# Patient Record
Sex: FEMALE | Race: WHITE | HISPANIC OR LATINO
[De-identification: names, ages, dates, MRNs, and addresses within clinical notes are randomized per-mention and may not be internally consistent; named-entity substitution may affect disease eponyms.]

---

## 2021-08-11 ENCOUNTER — TRANSCRIPTION ENCOUNTER (OUTPATIENT)
Age: 56
End: 2021-08-11

## 2022-09-13 ENCOUNTER — NON-APPOINTMENT (OUTPATIENT)
Age: 57
End: 2022-09-13

## 2022-10-10 ENCOUNTER — APPOINTMENT (OUTPATIENT)
Dept: ORTHOPEDIC SURGERY | Facility: CLINIC | Age: 57
End: 2022-10-10

## 2022-10-10 VITALS — WEIGHT: 160 LBS | BODY MASS INDEX: 31.41 KG/M2 | HEIGHT: 60 IN

## 2022-10-10 DIAGNOSIS — M79.643 PAIN IN UNSPECIFIED HAND: ICD-10-CM

## 2022-10-10 PROCEDURE — 99072 ADDL SUPL MATRL&STAF TM PHE: CPT

## 2022-10-10 PROCEDURE — 99203 OFFICE O/P NEW LOW 30 MIN: CPT | Mod: 25

## 2022-10-10 PROCEDURE — 73130 X-RAY EXAM OF HAND: CPT | Mod: RT

## 2022-10-10 PROCEDURE — 20605 DRAIN/INJ JOINT/BURSA W/O US: CPT | Mod: RT

## 2022-10-10 PROCEDURE — J3490M: CUSTOM

## 2022-10-10 PROCEDURE — L3908: CPT | Mod: RT

## 2022-10-11 NOTE — HISTORY OF PRESENT ILLNESS
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

## 2022-10-11 NOTE — PHYSICAL EXAM
[de-identified] : R wrist:\par Swelling\par Tender TFCC\par Pain with pronation/supination\par Decreased wrist ROM\par +TFCC grind\par \par \par Xrays neg

## 2022-10-12 RX ORDER — MELOXICAM 15 MG/1
15 TABLET ORAL
Qty: 30 | Refills: 2 | Status: ACTIVE | COMMUNITY
Start: 2022-10-10 | End: 1900-01-01

## 2022-10-16 ENCOUNTER — APPOINTMENT (OUTPATIENT)
Dept: MRI IMAGING | Facility: CLINIC | Age: 57
End: 2022-10-16

## 2022-11-07 ENCOUNTER — FORM ENCOUNTER (OUTPATIENT)
Age: 57
End: 2022-11-07

## 2022-11-08 ENCOUNTER — APPOINTMENT (OUTPATIENT)
Dept: MRI IMAGING | Facility: CLINIC | Age: 57
End: 2022-11-08

## 2022-11-08 PROCEDURE — 73221 MRI JOINT UPR EXTREM W/O DYE: CPT | Mod: RT

## 2022-11-08 PROCEDURE — 99072 ADDL SUPL MATRL&STAF TM PHE: CPT

## 2022-11-14 ENCOUNTER — APPOINTMENT (OUTPATIENT)
Dept: ORTHOPEDIC SURGERY | Facility: CLINIC | Age: 57
End: 2022-11-14

## 2022-11-21 ENCOUNTER — APPOINTMENT (OUTPATIENT)
Dept: ORTHOPEDIC SURGERY | Facility: CLINIC | Age: 57
End: 2022-11-21

## 2022-11-23 ENCOUNTER — APPOINTMENT (OUTPATIENT)
Dept: ORTHOPEDIC SURGERY | Facility: CLINIC | Age: 57
End: 2022-11-23

## 2022-11-23 VITALS — BODY MASS INDEX: 22.9 KG/M2 | WEIGHT: 160 LBS | HEIGHT: 70 IN

## 2022-11-23 PROCEDURE — 99214 OFFICE O/P EST MOD 30 MIN: CPT | Mod: 25

## 2022-11-23 PROCEDURE — 20550 NJX 1 TENDON SHEATH/LIGAMENT: CPT

## 2022-11-23 PROCEDURE — 99072 ADDL SUPL MATRL&STAF TM PHE: CPT

## 2022-11-23 NOTE — ASSESSMENT
[FreeTextEntry1] : It appears that the greatest area of concern is the ECU.  \par \par The patient was advised of the diagnosis. The natural history of the pathology was explained in full to the patient in layman's terms. All questions were answered. The risks and benefits of surgical and non-surgical treatment alternatives were explained in full to the patient.\par \par Pt will continue with brace.\par Start OT\par Pt was given CSI in ECU today\par \par Tendon sheath injection was performed of the left wrist. The indication for this procedure was pain and inflammation. The site was prepped with alcohol and ethyl chloride sprayed topically. An injection of Lidocaine 1cc of 1%  was used Betamethasone (Celestone) 1cc of 6mg.  Patient was advised to call if redness, pain or fever occur and apply ice for 15 minutes out of every hour for the next 12-24 hours as tolerated. The risks benefits, and alternatives have been discussed, and verbal consent was obtained\par

## 2022-11-23 NOTE — IMAGING
[de-identified] : R wrist:\par Ulnar sided swelling\par Tender TFCC and *ECU* regions w/o noted subluxation with ROM.\par Pain with pronation/supination\par Decreased wrist flexion to 60 deg / flexion to 70 degrees due to discomfort.\par Mildly +TFCC grind\par Jacob and Finkelstein Tests are negative.\par \par \par

## 2022-12-19 ENCOUNTER — APPOINTMENT (OUTPATIENT)
Dept: ORTHOPEDIC SURGERY | Facility: CLINIC | Age: 57
End: 2022-12-19

## 2022-12-19 VITALS — BODY MASS INDEX: 22.9 KG/M2 | HEIGHT: 70 IN | WEIGHT: 160 LBS

## 2022-12-19 PROCEDURE — 99213 OFFICE O/P EST LOW 20 MIN: CPT

## 2022-12-19 PROCEDURE — 99072 ADDL SUPL MATRL&STAF TM PHE: CPT

## 2022-12-19 NOTE — HISTORY OF PRESENT ILLNESS
[Work related] : work related [6] : 6 [5] : 5 [Dull/Aching] : dull/aching [Full time] : Work status: full time [de-identified] : R wrist MRI shows TFCC tear, ECU tendonitis [FreeTextEntry1] : R hand [de-identified] : therapy

## 2022-12-19 NOTE — ASSESSMENT
[FreeTextEntry1] : I discussed injeciton and surgery\par I disucssed NSIADS\par Brace\par Ice\par Therapy \par Return in 4 weeks

## 2022-12-19 NOTE — PHYSICAL EXAM
[de-identified] : R wrist:\par Swelling\par Tender TFCC\par Pain with pronation/supination\par Decreased wrist ROM\par +TFCC grind\par \par \par

## 2023-01-12 ENCOUNTER — APPOINTMENT (OUTPATIENT)
Dept: ORTHOPEDIC SURGERY | Facility: CLINIC | Age: 58
End: 2023-01-12
Payer: OTHER MISCELLANEOUS

## 2023-01-12 VITALS — WEIGHT: 160 LBS | BODY MASS INDEX: 22.9 KG/M2 | HEIGHT: 70 IN

## 2023-01-12 PROCEDURE — 99214 OFFICE O/P EST MOD 30 MIN: CPT | Mod: 57

## 2023-01-12 PROCEDURE — 99072 ADDL SUPL MATRL&STAF TM PHE: CPT

## 2023-01-12 NOTE — PHYSICAL EXAM
[de-identified] : R wrist:\par Swelling\par Tender TFCC\par Pain with pronation/supination\par Decreased wrist ROM\par +TFCC grind\par \par \par

## 2023-01-12 NOTE — ASSESSMENT
[FreeTextEntry1] : For R wrist arthroscopy TFCC debridement \par R/B/A of surgery discussed with the patient. Risks including but not limited to infection, nerve damage, tendon damage, pain, stiffness, recurrence, no resolution of symptoms, loss of function, limb or life. They understand and agree to surgery \par Return post op

## 2023-01-12 NOTE — HISTORY OF PRESENT ILLNESS
[Work related] : work related [9] : 9 [8] : 8 [Dull/Aching] : dull/aching [Part time] : Work status: part time [de-identified] : R wrist is still very painful [FreeTextEntry1] : r wrist [de-identified] : ice,therapy,brace

## 2023-02-05 ENCOUNTER — FORM ENCOUNTER (OUTPATIENT)
Age: 58
End: 2023-02-05

## 2023-02-08 ENCOUNTER — FORM ENCOUNTER (OUTPATIENT)
Age: 58
End: 2023-02-08

## 2023-02-14 ENCOUNTER — NON-APPOINTMENT (OUTPATIENT)
Age: 58
End: 2023-02-14

## 2023-02-16 ENCOUNTER — APPOINTMENT (OUTPATIENT)
Dept: ORTHOPEDIC SURGERY | Facility: CLINIC | Age: 58
End: 2023-02-16

## 2023-03-20 RX ORDER — OXYCODONE AND ACETAMINOPHEN 5; 325 MG/1; MG/1
5-325 TABLET ORAL
Qty: 15 | Refills: 0 | Status: ACTIVE | COMMUNITY
Start: 2023-03-20 | End: 1900-01-01

## 2023-03-22 ENCOUNTER — APPOINTMENT (OUTPATIENT)
Age: 58
End: 2023-03-22
Payer: OTHER MISCELLANEOUS

## 2023-03-22 PROCEDURE — 29846 WRIST ARTHROSCOPY/SURGERY: CPT | Mod: RT

## 2023-03-22 PROCEDURE — 29846 WRIST ARTHROSCOPY/SURGERY: CPT | Mod: AS,RT

## 2023-03-22 RX ORDER — TRAMADOL HYDROCHLORIDE 50 MG/1
50 TABLET, COATED ORAL EVERY 6 HOURS
Qty: 15 | Refills: 0 | Status: ACTIVE | COMMUNITY
Start: 2023-03-22 | End: 1900-01-01

## 2023-03-30 ENCOUNTER — APPOINTMENT (OUTPATIENT)
Dept: ORTHOPEDIC SURGERY | Facility: CLINIC | Age: 58
End: 2023-03-30
Payer: OTHER MISCELLANEOUS

## 2023-03-30 VITALS — BODY MASS INDEX: 22.9 KG/M2 | WEIGHT: 160 LBS | HEIGHT: 70 IN

## 2023-03-30 PROCEDURE — 99024 POSTOP FOLLOW-UP VISIT: CPT

## 2023-03-30 PROCEDURE — L3908: CPT | Mod: RT

## 2023-03-30 NOTE — PHYSICAL EXAM
[de-identified] : Mild hand swelling\par Healed incision\par No evidence of infection\par Mild tenderness at the surgical site\par Good finger ROM\par Wrist stiffness

## 2023-03-30 NOTE — ASSESSMENT
[FreeTextEntry1] : Sutures removed\par Steris applied\par Wrist brace provided in office\par Return in 3 weeks- therapy\par No work

## 2023-03-30 NOTE — HISTORY OF PRESENT ILLNESS
[Work related] : work related [7] : 7 [5] : 5 [Dull/Aching] : dull/aching [Not working due to injury] : Work status: not working due to injury [] : Post Surgical Visit: yes [de-identified] : R wrist TFCC debridement \par She is feeling better  [FreeTextEntry1] : R wrist  [de-identified] : no [de-identified] : 3/22/23 [de-identified] : R wrist arthroscopy TFCC debridement

## 2023-04-06 ENCOUNTER — NON-APPOINTMENT (OUTPATIENT)
Age: 58
End: 2023-04-06

## 2023-04-17 ENCOUNTER — APPOINTMENT (OUTPATIENT)
Dept: ORTHOPEDIC SURGERY | Facility: CLINIC | Age: 58
End: 2023-04-17
Payer: OTHER MISCELLANEOUS

## 2023-04-17 VITALS — HEIGHT: 70 IN | BODY MASS INDEX: 22.9 KG/M2 | WEIGHT: 160 LBS

## 2023-04-17 PROCEDURE — 99024 POSTOP FOLLOW-UP VISIT: CPT

## 2023-04-17 NOTE — HISTORY OF PRESENT ILLNESS
[8] : 8 [Dull/Aching] : dull/aching [] : Post Surgical Visit: yes [de-identified] : R wrist TFCC debridement 3-4 weeks ago \par She still has pain  [FreeTextEntry1] : R wrist [de-identified] : none [de-identified] : 3/22/23 [de-identified] : right wrist WRIST ARTHROSCOPY TFCC DEBRIEDMENT

## 2023-05-11 ENCOUNTER — APPOINTMENT (OUTPATIENT)
Dept: ORTHOPEDIC SURGERY | Facility: CLINIC | Age: 58
End: 2023-05-11
Payer: OTHER MISCELLANEOUS

## 2023-05-11 VITALS — HEIGHT: 70 IN | WEIGHT: 160 LBS | BODY MASS INDEX: 22.9 KG/M2

## 2023-05-11 PROCEDURE — 99024 POSTOP FOLLOW-UP VISIT: CPT

## 2023-05-11 NOTE — HISTORY OF PRESENT ILLNESS
[Work related] : work related [7] : 7 [4] : 4 [Dull/Aching] : dull/aching [Full time] : Work status: full time [] : Post Surgical Visit: yes [de-identified] : R wrist TFCC debridement\par She is gettting better\par Still has some pain  [FreeTextEntry1] : R wrist [de-identified] : OT,wrap [de-identified] : 3/22/23 [de-identified] : right wrist WRIST ARTHROSCOPY TFCC DEBRIEDMENT

## 2023-05-31 ENCOUNTER — OFFICE (OUTPATIENT)
Facility: LOCATION | Age: 58
Setting detail: OPHTHALMOLOGY
End: 2023-05-31
Payer: MEDICAID

## 2023-05-31 DIAGNOSIS — H33.312: ICD-10-CM

## 2023-05-31 DIAGNOSIS — H43.393: ICD-10-CM

## 2023-05-31 DIAGNOSIS — H25.13: ICD-10-CM

## 2023-05-31 DIAGNOSIS — H16.223: ICD-10-CM

## 2023-05-31 PROCEDURE — 92014 COMPRE OPH EXAM EST PT 1/>: CPT | Performed by: OPHTHALMOLOGY

## 2023-05-31 PROCEDURE — 92134 CPTRZ OPH DX IMG PST SGM RTA: CPT | Performed by: OPHTHALMOLOGY

## 2023-05-31 ASSESSMENT — REFRACTION_AUTOREFRACTION
OD_SPHERE: +2.00
OS_CYLINDER: SPH
OD_CYLINDER: -0.75
OS_SPHERE: +1.75
OD_AXIS: 037

## 2023-05-31 ASSESSMENT — REFRACTION_MANIFEST
OS_SPHERE: +0.50
OS_AXIS: 135
OD_SPHERE: +1.00
OD_VA1: 20/25
OS_ADD: +2.00
OS_VA1: 20/30
OD_AXIS: 20
OS_CYLINDER: -0.50
OD_CYLINDER: -0.50
OD_ADD: +2.00

## 2023-05-31 ASSESSMENT — REFRACTION_CURRENTRX
OS_SPHERE: +2.75
OS_VPRISM_DIRECTION: SV
OS_OVR_VA: 20/
OD_SPHERE: +2.75
OD_SPHERE: +2.25
OD_AXIS: 017
OD_VPRISM_DIRECTION: SV
OS_AXIS: 140
OS_OVR_VA: 20/
OS_SPHERE: +2.25
OS_CYLINDER: -0.50
OD_OVR_VA: 20/
OD_OVR_VA: 20/
OD_CYLINDER: -0.50

## 2023-05-31 ASSESSMENT — SUPERFICIAL PUNCTATE KERATITIS (SPK)
OD_SPK: 3+
OS_SPK: 3+

## 2023-05-31 ASSESSMENT — CONFRONTATIONAL VISUAL FIELD TEST (CVF)
OD_FINDINGS: FULL
OS_FINDINGS: FULL

## 2023-05-31 ASSESSMENT — VISUAL ACUITY
OS_BCVA: 20/50
OD_BCVA: 20/60+1

## 2023-05-31 ASSESSMENT — TONOMETRY
OS_IOP_MMHG: 15
OD_IOP_MMHG: 15

## 2023-05-31 ASSESSMENT — SPHEQUIV_DERIVED
OD_SPHEQUIV: 0.75
OS_SPHEQUIV: 0.25
OD_SPHEQUIV: 1.625

## 2023-06-01 ENCOUNTER — APPOINTMENT (OUTPATIENT)
Dept: ORTHOPEDIC SURGERY | Facility: CLINIC | Age: 58
End: 2023-06-01
Payer: OTHER MISCELLANEOUS

## 2023-06-01 ENCOUNTER — OFFICE (OUTPATIENT)
Dept: URBAN - METROPOLITAN AREA CLINIC 77 | Facility: CLINIC | Age: 58
Setting detail: OPHTHALMOLOGY
End: 2023-06-01
Payer: MEDICAID

## 2023-06-01 VITALS — WEIGHT: 160 LBS | HEIGHT: 70 IN | BODY MASS INDEX: 22.9 KG/M2

## 2023-06-01 DIAGNOSIS — H33.312: ICD-10-CM

## 2023-06-01 DIAGNOSIS — H43.22: ICD-10-CM

## 2023-06-01 DIAGNOSIS — H25.13: ICD-10-CM

## 2023-06-01 PROBLEM — H43.393 VITREOUS FLOATERS; BOTH EYES: Status: ACTIVE | Noted: 2023-05-31

## 2023-06-01 PROBLEM — H11.153 PINGUECULA; BOTH EYES: Status: ACTIVE | Noted: 2023-05-31

## 2023-06-01 PROCEDURE — 92250 FUNDUS PHOTOGRAPHY W/I&R: CPT | Performed by: OPHTHALMOLOGY

## 2023-06-01 PROCEDURE — 99024 POSTOP FOLLOW-UP VISIT: CPT

## 2023-06-01 PROCEDURE — 99214 OFFICE O/P EST MOD 30 MIN: CPT | Performed by: OPHTHALMOLOGY

## 2023-06-01 PROCEDURE — 67145 PROPH RTA DTCHMNT PC: CPT | Performed by: OPHTHALMOLOGY

## 2023-06-01 ASSESSMENT — KERATOMETRY
OD_K1POWER_DIOPTERS: 43.50
OS_K2POWER_DIOPTERS: 42.75
OD_AXISANGLE_DEGREES: 048
OS_AXISANGLE_DEGREES: 127
OD_K2POWER_DIOPTERS: 43.00
OS_K1POWER_DIOPTERS: 43.00

## 2023-06-01 ASSESSMENT — REFRACTION_CURRENTRX
OD_CYLINDER: -0.50
OD_SPHERE: +2.75
OD_SPHERE: +2.25
OS_AXIS: 140
OD_OVR_VA: 20/
OS_SPHERE: +2.75
OD_OVR_VA: 20/
OD_AXIS: 017
OS_OVR_VA: 20/
OD_VPRISM_DIRECTION: SV
OS_CYLINDER: -0.50
OS_OVR_VA: 20/
OS_SPHERE: +2.25
OS_VPRISM_DIRECTION: SV

## 2023-06-01 ASSESSMENT — REFRACTION_AUTOREFRACTION
OD_CYLINDER: -0.75
OS_SPHERE: +1.75
OS_CYLINDER: SPH
OD_SPHERE: +2.00
OD_AXIS: 037

## 2023-06-01 ASSESSMENT — AXIALLENGTH_DERIVED
OS_AL: 23.7248
OD_AL: 23.0628
OD_AL: 23.3932

## 2023-06-01 ASSESSMENT — VISUAL ACUITY
OS_BCVA: 20/30
OD_BCVA: 20/30

## 2023-06-01 ASSESSMENT — CONFRONTATIONAL VISUAL FIELD TEST (CVF)
OS_FINDINGS: FULL
OD_FINDINGS: FULL

## 2023-06-01 ASSESSMENT — REFRACTION_MANIFEST
OS_AXIS: 135
OS_CYLINDER: -0.50
OS_ADD: +2.00
OD_CYLINDER: -0.50
OS_SPHERE: +0.50
OS_VA1: 20/30
OD_SPHERE: +1.00
OD_VA1: 20/25
OD_AXIS: 20
OD_ADD: +2.00

## 2023-06-01 ASSESSMENT — SPHEQUIV_DERIVED
OD_SPHEQUIV: 0.75
OD_SPHEQUIV: 1.625
OS_SPHEQUIV: 0.25

## 2023-06-01 ASSESSMENT — SUPERFICIAL PUNCTATE KERATITIS (SPK)
OD_SPK: 3+
OS_SPK: 3+

## 2023-06-01 NOTE — HISTORY OF PRESENT ILLNESS
[Work related] : work related [5] : 5 [3] : 3 [Dull/Aching] : dull/aching [Full time] : Work status: full time [] : Post Surgical Visit: yes [de-identified] : R wist TFCC debridement\par She is feeling better\par Still has some pain  [FreeTextEntry1] : R wrist [de-identified] : OT,wrap [FreeTextEntry5] : pain is better\par  [de-identified] : 3/22/23 [de-identified] : right wrist WRIST ARTHROSCOPY TFCC DEBRIEDMENT

## 2023-06-03 ENCOUNTER — APPOINTMENT (OUTPATIENT)
Dept: MRI IMAGING | Facility: CLINIC | Age: 58
End: 2023-06-03

## 2023-06-08 ENCOUNTER — RX RENEWAL (OUTPATIENT)
Age: 58
End: 2023-06-08

## 2023-06-08 RX ORDER — NAPROXEN 500 MG/1
500 TABLET ORAL 3 TIMES DAILY
Qty: 60 | Refills: 0 | Status: ACTIVE | COMMUNITY
Start: 2023-01-12 | End: 1900-01-01

## 2023-06-15 ENCOUNTER — APPOINTMENT (OUTPATIENT)
Dept: ORTHOPEDIC SURGERY | Facility: CLINIC | Age: 58
End: 2023-06-15

## 2023-06-19 ENCOUNTER — FORM ENCOUNTER (OUTPATIENT)
Age: 58
End: 2023-06-19

## 2023-06-20 ENCOUNTER — APPOINTMENT (OUTPATIENT)
Dept: MRI IMAGING | Facility: CLINIC | Age: 58
End: 2023-06-20
Payer: OTHER MISCELLANEOUS

## 2023-06-20 ENCOUNTER — OFFICE (OUTPATIENT)
Facility: LOCATION | Age: 58
Setting detail: OPHTHALMOLOGY
End: 2023-06-20
Payer: MEDICAID

## 2023-06-20 ENCOUNTER — RX ONLY (RX ONLY)
Age: 58
End: 2023-06-20

## 2023-06-20 DIAGNOSIS — H16.223: ICD-10-CM

## 2023-06-20 DIAGNOSIS — H25.13: ICD-10-CM

## 2023-06-20 DIAGNOSIS — H25.12: ICD-10-CM

## 2023-06-20 PROBLEM — H25.11 CATARACT SENILE NUCLEAR SCLEROSIS; RIGHT EYE, LEFT EYE, BOTH EYES: Status: ACTIVE | Noted: 2023-06-20

## 2023-06-20 PROCEDURE — 73221 MRI JOINT UPR EXTREM W/O DYE: CPT | Mod: RT

## 2023-06-20 PROCEDURE — 92136 OPHTHALMIC BIOMETRY: CPT | Performed by: OPHTHALMOLOGY

## 2023-06-20 PROCEDURE — 99214 OFFICE O/P EST MOD 30 MIN: CPT | Performed by: OPHTHALMOLOGY

## 2023-06-20 ASSESSMENT — REFRACTION_AUTOREFRACTION
OS_SPHERE: +1.50
OD_AXIS: 039
OS_CYLINDER: SPH
OD_SPHERE: +1.50
OD_CYLINDER: -0.75

## 2023-06-20 ASSESSMENT — REFRACTION_CURRENTRX
OD_SPHERE: +2.25
OS_AXIS: 140
OD_AXIS: 017
OD_OVR_VA: 20/
OS_OVR_VA: 20/
OS_SPHERE: +2.25
OD_VPRISM_DIRECTION: SV
OS_CYLINDER: -0.50
OD_SPHERE: +2.75
OD_CYLINDER: -0.50
OD_OVR_VA: 20/
OS_VPRISM_DIRECTION: SV
OS_OVR_VA: 20/
OS_SPHERE: +2.75

## 2023-06-20 ASSESSMENT — REFRACTION_MANIFEST
OS_ADD: +2.00
OD_ADD: +2.00
OD_SPHERE: +1.00
OD_CYLINDER: -0.50
OS_VA1: 20/30
OD_VA1: 20/25
OS_SPHERE: +0.50
OS_CYLINDER: -0.50
OS_AXIS: 135
OD_AXIS: 20

## 2023-06-20 ASSESSMENT — TONOMETRY
OS_IOP_MMHG: 15
OD_IOP_MMHG: 16

## 2023-06-20 ASSESSMENT — AXIALLENGTH_DERIVED
OD_AL: 23.3932
OD_AL: 23.2504
OS_AL: 23.6323

## 2023-06-20 ASSESSMENT — KERATOMETRY
OS_K2POWER_DIOPTERS: 43.00
OS_K1POWER_DIOPTERS: 43.25
OD_AXISANGLE_DEGREES: 129
OD_K1POWER_DIOPTERS: 43.00
OS_AXISANGLE_DEGREES: 112
OD_K2POWER_DIOPTERS: 43.50

## 2023-06-20 ASSESSMENT — VISUAL ACUITY
OS_BCVA: 20/80
OD_BCVA: 20/60

## 2023-06-20 ASSESSMENT — SPHEQUIV_DERIVED
OD_SPHEQUIV: 0.75
OD_SPHEQUIV: 1.125
OS_SPHEQUIV: 0.25

## 2023-06-20 ASSESSMENT — CONFRONTATIONAL VISUAL FIELD TEST (CVF)
OD_FINDINGS: FULL
OS_FINDINGS: FULL

## 2023-06-20 ASSESSMENT — SUPERFICIAL PUNCTATE KERATITIS (SPK)
OS_SPK: 3+
OD_SPK: 3+

## 2023-06-25 ENCOUNTER — NON-APPOINTMENT (OUTPATIENT)
Age: 58
End: 2023-06-25

## 2023-06-26 ENCOUNTER — FORM ENCOUNTER (OUTPATIENT)
Age: 58
End: 2023-06-26

## 2023-07-17 ENCOUNTER — APPOINTMENT (OUTPATIENT)
Dept: ORTHOPEDIC SURGERY | Facility: CLINIC | Age: 58
End: 2023-07-17
Payer: OTHER MISCELLANEOUS

## 2023-07-17 VITALS — HEIGHT: 70 IN | WEIGHT: 160 LBS | BODY MASS INDEX: 22.9 KG/M2

## 2023-07-17 PROCEDURE — 99213 OFFICE O/P EST LOW 20 MIN: CPT

## 2023-07-17 NOTE — HISTORY OF PRESENT ILLNESS
[Work related] : work related [5] : 5 [Dull/Aching] : dull/aching [Full time] : Work status: full time [8] : 8 [de-identified] : R wrist MRI shows improvement in TFCC; significant ECU tendonitis [FreeTextEntry1] : R wrist [de-identified] : lifting an object, movement

## 2023-07-17 NOTE — PHYSICAL EXAM
[de-identified] : R wrist \par Swelling\par Min tender TFCC\par Improved ROM\par Tender along ECU\par

## 2023-08-15 ENCOUNTER — NON-APPOINTMENT (OUTPATIENT)
Age: 58
End: 2023-08-15

## 2023-08-17 ENCOUNTER — APPOINTMENT (OUTPATIENT)
Dept: ORTHOPEDIC SURGERY | Facility: CLINIC | Age: 58
End: 2023-08-17
Payer: OTHER MISCELLANEOUS

## 2023-08-17 VITALS — BODY MASS INDEX: 22.9 KG/M2 | HEIGHT: 70 IN | WEIGHT: 160 LBS

## 2023-08-17 PROCEDURE — 20550 NJX 1 TENDON SHEATH/LIGAMENT: CPT | Mod: RT

## 2023-08-17 PROCEDURE — J3490M: CUSTOM

## 2023-08-17 PROCEDURE — 99214 OFFICE O/P EST MOD 30 MIN: CPT | Mod: 25

## 2023-08-17 NOTE — HISTORY OF PRESENT ILLNESS
[Work related] : work related [4] : 4 [Dull/Aching] : dull/aching [Full time] : Work status: full time [de-identified] : R wrist still has ulnar sided pain  [FreeTextEntry1] : R wrist [de-identified] : pt

## 2023-08-17 NOTE — ASSESSMENT
[FreeTextEntry1] : R wrist ECU tendon sheath injection was performed because of pain inflammation and stiffness Anesthesia: ethyl chloride sprayed topically Celestone 6mg: An injection of Celestone 1cc Lidocaine: An injection of Lidocaine 1% 1cc Marcaine: An injection of Marcaine 0.5% 1cc  Patient has tried OTC's including aspirin, Ibuprofen, Aleve etc or prescription NSAIDS, and/or exercises at home and/ or physical therapy without satisfactory response. After verbal consent using sterile preparation and technique. The risks, benefits, and alternatives to cortisone injection were explained in full to the patient. Risks outlined include but are not limited to infection, sepsis, bleeding, scarring, skin discoloration, temporary increase in pain, syncopal episode, failure to resolve symptoms, allergic reaction, symptom recurrence, and elevation of blood sugar in diabetics. Patient understood the risks. All questions were answered. After discussion of options, patient requested an injection. Oral informed consent was obtained and sterile prep was done of the injection site. Sterile technique was utilized for the procedure including the preparation of the solutions used for the injection. Patient tolerated the procedure well. Advised to ice the injection site this evening. Prep with betadine locally to site. Sterile technique used

## 2023-08-17 NOTE — PHYSICAL EXAM
[de-identified] : R wrist \par  Swelling\par  Min tender TFCC\par  Improved ROM\par  Tender along ECU\par

## 2023-08-28 ENCOUNTER — ASC (OUTPATIENT)
Dept: URBAN - METROPOLITAN AREA SURGERY 8 | Facility: SURGERY | Age: 58
Setting detail: OPHTHALMOLOGY
End: 2023-08-28
Payer: MEDICAID

## 2023-08-28 DIAGNOSIS — H25.12: ICD-10-CM

## 2023-08-28 PROCEDURE — 66984 XCAPSL CTRC RMVL W/O ECP: CPT | Performed by: OPHTHALMOLOGY

## 2023-08-29 ENCOUNTER — OFFICE (OUTPATIENT)
Facility: LOCATION | Age: 58
Setting detail: OPHTHALMOLOGY
End: 2023-08-29
Payer: MEDICAID

## 2023-08-29 DIAGNOSIS — Z96.1: ICD-10-CM

## 2023-08-29 DIAGNOSIS — H26.491: ICD-10-CM

## 2023-08-29 PROCEDURE — 99024 POSTOP FOLLOW-UP VISIT: CPT | Performed by: OPHTHALMOLOGY

## 2023-08-29 ASSESSMENT — REFRACTION_AUTOREFRACTION
OD_CYLINDER: -1.00
OD_AXIS: 022
OS_CYLINDER: SPH
OD_SPHERE: +2.00
OS_SPHERE: -0.25

## 2023-08-29 ASSESSMENT — REFRACTION_CURRENTRX
OS_CYLINDER: -0.50
OD_SPHERE: +2.75
OS_SPHERE: +2.25
OS_SPHERE: +2.75
OS_OVR_VA: 20/
OS_AXIS: 140
OD_CYLINDER: -0.50
OS_VPRISM_DIRECTION: SV
OD_SPHERE: +2.25
OD_OVR_VA: 20/
OS_OVR_VA: 20/
OD_AXIS: 017
OD_VPRISM_DIRECTION: SV
OD_OVR_VA: 20/

## 2023-08-29 ASSESSMENT — REFRACTION_MANIFEST
OS_VA1: 20/30
OS_SPHERE: +0.50
OD_ADD: +2.00
OS_AXIS: 135
OD_VA1: 20/25
OD_CYLINDER: -0.50
OS_CYLINDER: -0.50
OD_SPHERE: +1.00
OS_ADD: +2.00
OD_AXIS: 20

## 2023-08-29 ASSESSMENT — SPHEQUIV_DERIVED
OD_SPHEQUIV: 1.5
OS_SPHEQUIV: 0.25
OD_SPHEQUIV: 0.75

## 2023-08-29 ASSESSMENT — AXIALLENGTH_DERIVED
OD_AL: 23.1535
OS_AL: 23.5863
OD_AL: 23.4384

## 2023-08-29 ASSESSMENT — SUPERFICIAL PUNCTATE KERATITIS (SPK)
OS_SPK: 3+
OD_SPK: 3+

## 2023-08-29 ASSESSMENT — KERATOMETRY
OS_K1POWER_DIOPTERS: 43.00
OS_K2POWER_DIOPTERS: 43.50
OD_K1POWER_DIOPTERS: 42.75
OD_AXISANGLE_DEGREES: 126
OS_AXISANGLE_DEGREES: 082
OD_K2POWER_DIOPTERS: 43.50

## 2023-08-29 ASSESSMENT — CONFRONTATIONAL VISUAL FIELD TEST (CVF)
OD_FINDINGS: FULL
OS_FINDINGS: FULL

## 2023-08-29 ASSESSMENT — VISUAL ACUITY
OD_BCVA: 20/40
OS_BCVA: 20/40-

## 2023-08-29 ASSESSMENT — TONOMETRY: OD_IOP_MMHG: 12

## 2023-09-08 ENCOUNTER — RX ONLY (RX ONLY)
Age: 58
End: 2023-09-08

## 2023-09-08 ENCOUNTER — OFFICE (OUTPATIENT)
Facility: LOCATION | Age: 58
Setting detail: OPHTHALMOLOGY
End: 2023-09-08
Payer: MEDICAID

## 2023-09-08 DIAGNOSIS — Z96.1: ICD-10-CM

## 2023-09-08 PROCEDURE — 99024 POSTOP FOLLOW-UP VISIT: CPT | Performed by: OPHTHALMOLOGY

## 2023-09-08 ASSESSMENT — REFRACTION_MANIFEST
OD_VA1: 20/25
OS_SPHERE: +0.50
OS_AXIS: 135
OD_AXIS: 20
OS_ADD: +2.00
OS_CYLINDER: -0.50
OD_CYLINDER: -0.50
OD_SPHERE: +1.00
OS_VA1: 20/30
OD_ADD: +2.00

## 2023-09-08 ASSESSMENT — KERATOMETRY
OS_K1POWER_DIOPTERS: 43.00
OD_K2POWER_DIOPTERS: 43.50
OD_AXISANGLE_DEGREES: 126
OD_K1POWER_DIOPTERS: 42.75
OS_AXISANGLE_DEGREES: 082
OS_K2POWER_DIOPTERS: 43.50

## 2023-09-08 ASSESSMENT — REFRACTION_AUTOREFRACTION
OD_SPHERE: +2.00
OD_AXIS: 026
OD_CYLINDER: -1.25
OS_SPHERE: -0.25
OS_CYLINDER: SPH

## 2023-09-08 ASSESSMENT — REFRACTION_CURRENTRX
OS_OVR_VA: 20/
OD_AXIS: 017
OS_AXIS: 140
OS_SPHERE: +2.75
OS_SPHERE: +2.25
OS_CYLINDER: -0.50
OD_VPRISM_DIRECTION: SV
OS_VPRISM_DIRECTION: SV
OD_SPHERE: +2.25
OD_OVR_VA: 20/
OD_CYLINDER: -0.50
OS_OVR_VA: 20/
OD_SPHERE: +2.75
OD_OVR_VA: 20/

## 2023-09-08 ASSESSMENT — SPHEQUIV_DERIVED
OD_SPHEQUIV: 0.75
OS_SPHEQUIV: 0.25
OD_SPHEQUIV: 1.375

## 2023-09-08 ASSESSMENT — CONFRONTATIONAL VISUAL FIELD TEST (CVF)
OS_FINDINGS: FULL
OD_FINDINGS: FULL

## 2023-09-08 ASSESSMENT — AXIALLENGTH_DERIVED
OD_AL: 23.4384
OD_AL: 23.2005
OS_AL: 23.5863

## 2023-09-08 ASSESSMENT — VISUAL ACUITY
OD_BCVA: 20/40
OS_BCVA: 20/50-

## 2023-09-08 ASSESSMENT — SUPERFICIAL PUNCTATE KERATITIS (SPK)
OD_SPK: 3+
OS_SPK: 3+

## 2023-09-08 ASSESSMENT — TONOMETRY: OS_IOP_MMHG: 16

## 2023-09-14 ENCOUNTER — APPOINTMENT (OUTPATIENT)
Dept: ORTHOPEDIC SURGERY | Facility: CLINIC | Age: 58
End: 2023-09-14
Payer: OTHER MISCELLANEOUS

## 2023-09-14 VITALS — HEIGHT: 70 IN | BODY MASS INDEX: 22.9 KG/M2 | WEIGHT: 160 LBS

## 2023-09-14 PROCEDURE — 99213 OFFICE O/P EST LOW 20 MIN: CPT

## 2023-09-21 ENCOUNTER — OFFICE (OUTPATIENT)
Facility: LOCATION | Age: 58
Setting detail: OPHTHALMOLOGY
End: 2023-09-21
Payer: MEDICAID

## 2023-09-21 ENCOUNTER — RX ONLY (RX ONLY)
Age: 58
End: 2023-09-21

## 2023-09-21 DIAGNOSIS — H26.492: ICD-10-CM

## 2023-09-21 DIAGNOSIS — Z96.1: ICD-10-CM

## 2023-09-21 PROCEDURE — 99024 POSTOP FOLLOW-UP VISIT: CPT | Performed by: OPHTHALMOLOGY

## 2023-09-21 ASSESSMENT — REFRACTION_CURRENTRX
OD_AXIS: 017
OS_AXIS: 140
OD_SPHERE: +2.25
OD_OVR_VA: 20/
OS_SPHERE: +2.75
OS_OVR_VA: 20/
OS_CYLINDER: -0.50
OD_CYLINDER: -0.50
OS_SPHERE: +2.25
OD_SPHERE: +2.75
OD_VPRISM_DIRECTION: SV
OS_VPRISM_DIRECTION: SV
OD_OVR_VA: 20/
OS_OVR_VA: 20/

## 2023-09-21 ASSESSMENT — CONFRONTATIONAL VISUAL FIELD TEST (CVF)
OS_FINDINGS: FULL
OD_FINDINGS: FULL

## 2023-09-21 ASSESSMENT — SPHEQUIV_DERIVED
OS_SPHEQUIV: -0.625
OD_SPHEQUIV: 1
OS_SPHEQUIV: -0.625
OD_SPHEQUIV: 0.75
OS_SPHEQUIV: 0.25
OD_SPHEQUIV: 1

## 2023-09-21 ASSESSMENT — KERATOMETRY
OS_K2POWER_DIOPTERS: 43.50
OD_K1POWER_DIOPTERS: 42.75
OD_K2POWER_DIOPTERS: 43.50
OS_AXISANGLE_DEGREES: 082
OS_K1POWER_DIOPTERS: 43.00
OD_AXISANGLE_DEGREES: 126

## 2023-09-21 ASSESSMENT — REFRACTION_MANIFEST
OS_SPHERE: -0.50
OD_ADD: +2.00
OD_AXIS: 20
OD_SPHERE: +1.00
OS_AXIS: 135
OS_CYLINDER: -0.50
OS_AXIS: 145
OS_SPHERE: +0.50
OS_ADD: +2.00
OD_CYLINDER: -1.50
OS_CYLINDER: -0.25
OD_SPHERE: +1.75
OS_VA1: 20/30
OD_AXIS: 030
OD_VA1: 20/25
OD_CYLINDER: -0.50

## 2023-09-21 ASSESSMENT — REFRACTION_AUTOREFRACTION
OD_CYLINDER: -1.50
OD_AXIS: 031
OS_SPHERE: -0.50
OS_AXIS: 143
OS_CYLINDER: -0.25
OD_SPHERE: +1.75

## 2023-09-21 ASSESSMENT — AXIALLENGTH_DERIVED
OS_AL: 23.9321
OS_AL: 23.5863
OS_AL: 23.9321
OD_AL: 23.3427
OD_AL: 23.4384
OD_AL: 23.3427

## 2023-09-21 ASSESSMENT — VISUAL ACUITY
OD_BCVA: 20/30-2
OS_BCVA: 20/50

## 2023-09-21 ASSESSMENT — SUPERFICIAL PUNCTATE KERATITIS (SPK)
OD_SPK: 3+
OS_SPK: 3+

## 2023-11-09 ENCOUNTER — APPOINTMENT (OUTPATIENT)
Dept: ORTHOPEDIC SURGERY | Facility: CLINIC | Age: 58
End: 2023-11-09
Payer: OTHER MISCELLANEOUS

## 2023-11-09 VITALS — BODY MASS INDEX: 34.95 KG/M2 | WEIGHT: 178 LBS | HEIGHT: 60 IN

## 2023-11-09 DIAGNOSIS — M65.9 SYNOVITIS AND TENOSYNOVITIS, UNSPECIFIED: ICD-10-CM

## 2023-11-09 PROCEDURE — 99213 OFFICE O/P EST LOW 20 MIN: CPT

## 2023-11-10 ENCOUNTER — OFFICE (OUTPATIENT)
Facility: LOCATION | Age: 58
Setting detail: OPHTHALMOLOGY
End: 2023-11-10
Payer: MEDICAID

## 2023-11-10 DIAGNOSIS — Z96.1: ICD-10-CM

## 2023-11-10 DIAGNOSIS — H26.492: ICD-10-CM

## 2023-11-10 DIAGNOSIS — H52.4: ICD-10-CM

## 2023-11-10 PROCEDURE — 92015 DETERMINE REFRACTIVE STATE: CPT | Performed by: OPHTHALMOLOGY

## 2023-11-10 PROCEDURE — 99024 POSTOP FOLLOW-UP VISIT: CPT | Performed by: OPHTHALMOLOGY

## 2023-11-10 ASSESSMENT — REFRACTION_CURRENTRX
OS_OVR_VA: 20/
OD_VPRISM_DIRECTION: SV
OS_AXIS: 140
OS_OVR_VA: 20/
OS_CYLINDER: -0.50
OS_SPHERE: +2.25
OD_OVR_VA: 20/
OD_OVR_VA: 20/
OD_CYLINDER: -0.50
OD_SPHERE: +2.75
OD_SPHERE: +2.25
OD_AXIS: 017
OS_SPHERE: +2.75
OS_VPRISM_DIRECTION: SV

## 2023-11-10 ASSESSMENT — REFRACTION_MANIFEST
OS_CYLINDER: -0.50
OD_SPHERE: +1.50
OS_ADD: +2.00
OD_CYLINDER: -1.00
OD_VA1: 20/30-2
OD_AXIS: 030
OS_AXIS: 095
OD_ADD: +2.00
OS_SPHERE: PLANO
OS_VA1: 20/25

## 2023-11-10 ASSESSMENT — REFRACTION_AUTOREFRACTION
OS_SPHERE: +0.25
OD_SPHERE: +2.00
OD_AXIS: 027
OS_CYLINDER: -0.75
OS_AXIS: 096
OD_CYLINDER: -1.00

## 2023-11-10 ASSESSMENT — SPHEQUIV_DERIVED
OS_SPHEQUIV: -0.125
OD_SPHEQUIV: 1
OD_SPHEQUIV: 1.5

## 2023-11-10 ASSESSMENT — CONFRONTATIONAL VISUAL FIELD TEST (CVF)
OD_FINDINGS: FULL
OS_FINDINGS: FULL

## 2023-11-10 ASSESSMENT — SUPERFICIAL PUNCTATE KERATITIS (SPK)
OS_SPK: 2+
OD_SPK: 2+

## 2024-01-10 ENCOUNTER — OFFICE (OUTPATIENT)
Facility: LOCATION | Age: 59
Setting detail: OPHTHALMOLOGY
End: 2024-01-10
Payer: MEDICAID

## 2024-01-10 DIAGNOSIS — H43.813: ICD-10-CM

## 2024-01-10 DIAGNOSIS — H25.12: ICD-10-CM

## 2024-01-10 DIAGNOSIS — H33.312: ICD-10-CM

## 2024-01-10 DIAGNOSIS — H16.223: ICD-10-CM

## 2024-01-10 DIAGNOSIS — H26.492: ICD-10-CM

## 2024-01-10 DIAGNOSIS — H43.393: ICD-10-CM

## 2024-01-10 DIAGNOSIS — H43.22: ICD-10-CM

## 2024-01-10 DIAGNOSIS — Z96.1: ICD-10-CM

## 2024-01-10 DIAGNOSIS — H11.153: ICD-10-CM

## 2024-01-10 PROCEDURE — 92134 CPTRZ OPH DX IMG PST SGM RTA: CPT | Performed by: OPHTHALMOLOGY

## 2024-01-10 PROCEDURE — 92014 COMPRE OPH EXAM EST PT 1/>: CPT | Performed by: OPHTHALMOLOGY

## 2024-01-10 ASSESSMENT — REFRACTION_CURRENTRX
OS_OVR_VA: 20/
OS_VPRISM_DIRECTION: SV
OD_SPHERE: +2.75
OD_VPRISM_DIRECTION: SV
OS_CYLINDER: -0.50
OD_CYLINDER: -0.50
OD_SPHERE: +2.25
OD_AXIS: 017
OD_OVR_VA: 20/
OS_AXIS: 140
OD_OVR_VA: 20/
OS_SPHERE: +2.25
OS_SPHERE: +2.75
OS_OVR_VA: 20/

## 2024-01-10 ASSESSMENT — REFRACTION_MANIFEST
OS_VA1: 20/25
OD_VA1: 20/30-2
OS_ADD: +2.00
OS_AXIS: 095
OS_SPHERE: PLANO
OD_AXIS: 030
OD_SPHERE: +1.50
OD_CYLINDER: -1.00
OD_ADD: +2.00
OS_CYLINDER: -0.50

## 2024-01-10 ASSESSMENT — REFRACTION_AUTOREFRACTION
OS_SPHERE: +0.25
OD_SPHERE: +1.75
OD_CYLINDER: -1.00
OS_AXIS: 099
OS_CYLINDER: -0.75
OD_AXIS: 035

## 2024-01-10 ASSESSMENT — SPHEQUIV_DERIVED
OD_SPHEQUIV: 1
OD_SPHEQUIV: 1.25
OS_SPHEQUIV: -0.125

## 2024-01-10 ASSESSMENT — CONFRONTATIONAL VISUAL FIELD TEST (CVF)
OD_FINDINGS: FULL
OS_FINDINGS: FULL

## 2024-01-10 ASSESSMENT — SUPERFICIAL PUNCTATE KERATITIS (SPK)
OS_SPK: 3+
OD_SPK: 3+

## 2024-01-19 ENCOUNTER — OFFICE (OUTPATIENT)
Dept: URBAN - METROPOLITAN AREA CLINIC 77 | Facility: CLINIC | Age: 59
Setting detail: OPHTHALMOLOGY
End: 2024-01-19
Payer: MEDICAID

## 2024-01-19 DIAGNOSIS — H43.22: ICD-10-CM

## 2024-01-19 DIAGNOSIS — H52.31: ICD-10-CM

## 2024-01-19 DIAGNOSIS — H52.211: ICD-10-CM

## 2024-01-19 DIAGNOSIS — H53.10: ICD-10-CM

## 2024-01-19 DIAGNOSIS — H02.402: ICD-10-CM

## 2024-01-19 DIAGNOSIS — H43.393: ICD-10-CM

## 2024-01-19 DIAGNOSIS — H25.11: ICD-10-CM

## 2024-01-19 DIAGNOSIS — H33.312: ICD-10-CM

## 2024-01-19 DIAGNOSIS — H16.223: ICD-10-CM

## 2024-01-19 DIAGNOSIS — Z96.1: ICD-10-CM

## 2024-01-19 DIAGNOSIS — H43.813: ICD-10-CM

## 2024-01-19 DIAGNOSIS — H26.492: ICD-10-CM

## 2024-01-19 PROCEDURE — 92083 EXTENDED VISUAL FIELD XM: CPT | Performed by: OPHTHALMOLOGY

## 2024-01-19 PROCEDURE — 92250 FUNDUS PHOTOGRAPHY W/I&R: CPT | Performed by: OPHTHALMOLOGY

## 2024-01-19 PROCEDURE — 92025 CPTRIZED CORNEAL TOPOGRAPHY: CPT | Performed by: OPHTHALMOLOGY

## 2024-01-19 PROCEDURE — 76512 OPH US DX B-SCAN: CPT | Mod: LT | Performed by: OPHTHALMOLOGY

## 2024-01-19 PROCEDURE — 99214 OFFICE O/P EST MOD 30 MIN: CPT | Performed by: OPHTHALMOLOGY

## 2024-01-19 ASSESSMENT — REFRACTION_MANIFEST
OS_ADD: +2.00
OS_AXIS: 095
OS_CYLINDER: -0.50
OD_CYLINDER: -1.00
OD_SPHERE: +1.50
OS_VA1: 20/25
OD_AXIS: 030
OD_ADD: +2.00
OS_SPHERE: PLANO
OD_VA1: 20/30-2

## 2024-01-19 ASSESSMENT — LID POSITION - PTOSIS: OS_PTOSIS: LUL 1+

## 2024-01-19 ASSESSMENT — REFRACTION_AUTOREFRACTION
OD_CYLINDER: -1.75
OS_AXIS: 156
OD_SPHERE: +2.00
OS_SPHERE: -0.50
OS_CYLINDER: -0.25
OD_AXIS: 032

## 2024-01-19 ASSESSMENT — SUPERFICIAL PUNCTATE KERATITIS (SPK)
OS_SPK: 3+
OD_SPK: 3+

## 2024-01-19 ASSESSMENT — REFRACTION_CURRENTRX
OD_SPHERE: +2.25
OS_OVR_VA: 20/
OS_AXIS: 140
OD_OVR_VA: 20/
OS_SPHERE: +2.25
OD_SPHERE: +2.75
OD_OVR_VA: 20/
OS_OVR_VA: 20/
OS_SPHERE: +2.75
OD_AXIS: 017
OS_CYLINDER: -0.50
OD_VPRISM_DIRECTION: SV
OD_CYLINDER: -0.50
OS_VPRISM_DIRECTION: SV

## 2024-01-19 ASSESSMENT — SPHEQUIV_DERIVED
OS_SPHEQUIV: -0.625
OD_SPHEQUIV: 1.125
OD_SPHEQUIV: 1

## 2024-01-19 ASSESSMENT — CONFRONTATIONAL VISUAL FIELD TEST (CVF)
OD_FINDINGS: FULL
OS_FINDINGS: FULL

## 2024-04-02 ENCOUNTER — APPOINTMENT (OUTPATIENT)
Dept: ORTHOPEDIC SURGERY | Facility: CLINIC | Age: 59
End: 2024-04-02
Payer: OTHER MISCELLANEOUS

## 2024-04-02 VITALS — WEIGHT: 178 LBS | HEIGHT: 60 IN | BODY MASS INDEX: 34.95 KG/M2

## 2024-04-02 DIAGNOSIS — M77.8 OTHER ENTHESOPATHIES, NOT ELSEWHERE CLASSIFIED: ICD-10-CM

## 2024-04-02 DIAGNOSIS — S63.591A OTHER SPECIFIED SPRAIN OF RIGHT WRIST, INITIAL ENCOUNTER: ICD-10-CM

## 2024-04-02 DIAGNOSIS — S63.091A OTHER SUBLUXATION OF RIGHT WRIST AND HAND, INITIAL ENCOUNTER: ICD-10-CM

## 2024-04-02 PROCEDURE — 99243 OFF/OP CNSLTJ NEW/EST LOW 30: CPT

## 2024-04-02 PROCEDURE — 99455 WORK RELATED DISABILITY EXAM: CPT

## 2024-04-02 NOTE — ASSESSMENT
[FreeTextEntry1] : 30% loss R hand  Return prn  We discussed HEP, brace She will have permanent disability with R hand and wrist We discussed surgery in the future

## 2024-04-02 NOTE — HISTORY OF PRESENT ILLNESS
[Work related] : work related [7] : 7 [4] : 4 [Dull/Aching] : dull/aching [Tingling] : tingling [Full time] : Work status: full time [FreeTextEntry1] : R wrist [de-identified] : R wrist is painful, stiff  She has reached MMI without further intervention  [de-identified] : ice,advil

## 2024-04-11 ENCOUNTER — OFFICE (OUTPATIENT)
Facility: LOCATION | Age: 59
Setting detail: OPHTHALMOLOGY
End: 2024-04-11
Payer: MEDICAID

## 2024-04-11 DIAGNOSIS — H16.223: ICD-10-CM

## 2024-04-11 DIAGNOSIS — H43.393: ICD-10-CM

## 2024-04-11 DIAGNOSIS — H43.813: ICD-10-CM

## 2024-04-11 DIAGNOSIS — H25.11: ICD-10-CM

## 2024-04-11 DIAGNOSIS — H26.492: ICD-10-CM

## 2024-04-11 PROCEDURE — 99214 OFFICE O/P EST MOD 30 MIN: CPT | Performed by: OPHTHALMOLOGY

## 2024-04-11 PROCEDURE — 92133 CPTRZD OPH DX IMG PST SGM ON: CPT | Performed by: OPHTHALMOLOGY

## 2024-04-11 PROCEDURE — 92136 OPHTHALMIC BIOMETRY: CPT | Mod: RT | Performed by: OPHTHALMOLOGY

## 2024-04-11 ASSESSMENT — LID POSITION - PTOSIS: OS_PTOSIS: LUL 1+

## 2024-05-01 ENCOUNTER — OFFICE (OUTPATIENT)
Facility: LOCATION | Age: 59
Setting detail: OPHTHALMOLOGY
End: 2024-05-01
Payer: MEDICAID

## 2024-05-01 DIAGNOSIS — H20.00: ICD-10-CM

## 2024-05-01 DIAGNOSIS — H43.22: ICD-10-CM

## 2024-05-01 PROCEDURE — 99213 OFFICE O/P EST LOW 20 MIN: CPT | Performed by: OPHTHALMOLOGY

## 2024-05-01 ASSESSMENT — LID POSITION - PTOSIS: OS_PTOSIS: LUL 1+

## 2024-05-01 ASSESSMENT — CONFRONTATIONAL VISUAL FIELD TEST (CVF)
OS_FINDINGS: FULL
OD_FINDINGS: FULL

## 2024-05-17 ENCOUNTER — NON-APPOINTMENT (OUTPATIENT)
Age: 59
End: 2024-05-17

## 2024-10-17 ENCOUNTER — APPOINTMENT (OUTPATIENT)
Dept: MRI IMAGING | Facility: CLINIC | Age: 59
End: 2024-10-17
Payer: OTHER MISCELLANEOUS

## 2024-10-17 ENCOUNTER — APPOINTMENT (OUTPATIENT)
Dept: ORTHOPEDIC SURGERY | Facility: CLINIC | Age: 59
End: 2024-10-17
Payer: OTHER MISCELLANEOUS

## 2024-10-17 VITALS — WEIGHT: 178 LBS | BODY MASS INDEX: 34.95 KG/M2 | HEIGHT: 60 IN

## 2024-10-17 DIAGNOSIS — M77.8 OTHER ENTHESOPATHIES, NOT ELSEWHERE CLASSIFIED: ICD-10-CM

## 2024-10-17 DIAGNOSIS — S63.591A OTHER SPECIFIED SPRAIN OF RIGHT WRIST, INITIAL ENCOUNTER: ICD-10-CM

## 2024-10-17 DIAGNOSIS — S63.091A OTHER SUBLUXATION OF RIGHT WRIST AND HAND, INITIAL ENCOUNTER: ICD-10-CM

## 2024-10-17 DIAGNOSIS — M65.939 UNSPECIFIED SYNOVITIS AND TENOSYNOVITIS, UNSPECIFIED FOREARM: ICD-10-CM

## 2024-10-17 PROCEDURE — 20605 DRAIN/INJ JOINT/BURSA W/O US: CPT | Mod: RT

## 2024-10-17 PROCEDURE — 99213 OFFICE O/P EST LOW 20 MIN: CPT | Mod: 25

## 2024-10-17 PROCEDURE — 73221 MRI JOINT UPR EXTREM W/O DYE: CPT | Mod: RT

## 2024-10-17 PROCEDURE — J3490M: CUSTOM

## 2024-11-21 ENCOUNTER — APPOINTMENT (OUTPATIENT)
Dept: ORTHOPEDIC SURGERY | Facility: CLINIC | Age: 59
End: 2024-11-21

## 2024-12-19 ENCOUNTER — APPOINTMENT (OUTPATIENT)
Dept: ORTHOPEDIC SURGERY | Facility: CLINIC | Age: 59
End: 2024-12-19

## 2024-12-19 VITALS — WEIGHT: 178 LBS | BODY MASS INDEX: 34.95 KG/M2 | HEIGHT: 60 IN

## 2025-01-09 ENCOUNTER — APPOINTMENT (OUTPATIENT)
Dept: ORTHOPEDIC SURGERY | Facility: CLINIC | Age: 60
End: 2025-01-09

## 2025-01-09 VITALS — WEIGHT: 178 LBS | BODY MASS INDEX: 34.95 KG/M2 | HEIGHT: 60 IN

## 2025-01-09 DIAGNOSIS — S63.591A OTHER SPECIFIED SPRAIN OF RIGHT WRIST, INITIAL ENCOUNTER: ICD-10-CM

## 2025-01-09 PROCEDURE — 99214 OFFICE O/P EST MOD 30 MIN: CPT

## 2025-02-06 ENCOUNTER — APPOINTMENT (OUTPATIENT)
Dept: ORTHOPEDIC SURGERY | Facility: CLINIC | Age: 60
End: 2025-02-06
Payer: OTHER MISCELLANEOUS

## 2025-02-06 VITALS — WEIGHT: 178 LBS | HEIGHT: 60 IN | BODY MASS INDEX: 34.95 KG/M2

## 2025-02-06 DIAGNOSIS — S63.591A OTHER SPECIFIED SPRAIN OF RIGHT WRIST, INITIAL ENCOUNTER: ICD-10-CM

## 2025-02-06 PROCEDURE — 20605 DRAIN/INJ JOINT/BURSA W/O US: CPT | Mod: RT

## 2025-02-06 PROCEDURE — J3490M: CUSTOM

## 2025-02-06 PROCEDURE — 99213 OFFICE O/P EST LOW 20 MIN: CPT | Mod: 25

## 2025-03-10 ENCOUNTER — APPOINTMENT (OUTPATIENT)
Dept: ORTHOPEDIC SURGERY | Facility: CLINIC | Age: 60
End: 2025-03-10
Payer: OTHER MISCELLANEOUS

## 2025-03-10 VITALS — HEIGHT: 60 IN | BODY MASS INDEX: 34.95 KG/M2 | WEIGHT: 178 LBS

## 2025-03-10 DIAGNOSIS — S63.591A OTHER SPECIFIED SPRAIN OF RIGHT WRIST, INITIAL ENCOUNTER: ICD-10-CM

## 2025-03-10 PROCEDURE — 99214 OFFICE O/P EST MOD 30 MIN: CPT

## 2025-04-10 ENCOUNTER — APPOINTMENT (OUTPATIENT)
Dept: ORTHOPEDIC SURGERY | Facility: CLINIC | Age: 60
End: 2025-04-10
Payer: OTHER MISCELLANEOUS

## 2025-04-10 VITALS — HEIGHT: 60 IN | WEIGHT: 175 LBS | BODY MASS INDEX: 34.36 KG/M2

## 2025-04-10 DIAGNOSIS — S63.591A OTHER SPECIFIED SPRAIN OF RIGHT WRIST, INITIAL ENCOUNTER: ICD-10-CM

## 2025-04-10 DIAGNOSIS — R20.0 ANESTHESIA OF SKIN: ICD-10-CM

## 2025-04-10 PROCEDURE — 99214 OFFICE O/P EST MOD 30 MIN: CPT
